# Patient Record
Sex: MALE
[De-identification: names, ages, dates, MRNs, and addresses within clinical notes are randomized per-mention and may not be internally consistent; named-entity substitution may affect disease eponyms.]

---

## 2022-11-27 ENCOUNTER — NURSE TRIAGE (OUTPATIENT)
Dept: OTHER | Facility: CLINIC | Age: 32
End: 2022-11-27

## 2022-11-28 NOTE — TELEPHONE ENCOUNTER
Location of patient: [place]    Subjective: Caller states \"was seen yesterday at [facility] emergency was sent home and was diagnosed with the flu. Had a fever over 103 yesterday. Today has been using tylenol and ibuprofen as doctor suggested. Symptoms began Sunday of last week, day 7 days. Current Symptoms: fever, body aches, cough, headache . Chest pain only when coughing     Associated Symptoms: NA    Pain Severity: 8/10; aching; constant    Temperature: 100.4by forehead thermometer    What has been tried: tylenol and ibuprofen, cold rag on head, rubbed alcohol on body, tea and chicken noodle- trying to keep hydrated      Recommended disposition: Adamaris Lopez [id number] advice provided, patient verbalizes understanding; denies any other questions or concerns. Outcome:  Home Care    Reason for Disposition   [1] Influenza diagnosed by doctor (or NP/PA) AND [4] no complications    Protocols used:  Influenza Follow-up Call-ADULT-

## 2022-11-30 ENCOUNTER — NURSE TRIAGE (OUTPATIENT)
Dept: OTHER | Facility: CLINIC | Age: 32
End: 2022-11-30

## 2025-03-10 NOTE — TELEPHONE ENCOUNTER
Location of patient: [place]     Subjective: Patient has had the flu for 5 days now. Calling now because his body aches, chills and nausea are getting worse. Denies vomiting. Mild diarrhea present. Temperature is 99.3 via forehead route. Abdominal cramping comes and goes but resolves with diarrhea. Denies difficulty breathing. Patient has a productive cough with \"a lot\" mucous. Mucous is green and brown in color. Everytime the patient takes a deep breat his chest burns. Patient also states he has a \"strong\" metallic taste in his mouth. Current Symptoms: Cough, body aches, chills, nausea, and chest pain    Temperature: 99.3 by forehead thermometer    What has been tried: Tylenol, Ibuprofen, mucinex and tessalon pearls     Recommended disposition: Go to ED Now    Care advice provided, patient verbalizes understanding; denies any other questions or concerns. Outcome: Patient/caller agrees to proceed to [facility] Patient stated he is going to [facility]. Reason for Disposition   Chest pain  (Exception: MILD central chest pain, present only when coughing)    Protocols used:  Influenza Follow-up Call-ADULT-
Opt out